# Patient Record
(demographics unavailable — no encounter records)

---

## 2025-04-25 NOTE — CONSULT LETTER
[Today's Date] : [unfilled] [Name] : Name: [unfilled] [] : : ~~ [Today's Date:] : [unfilled] [Dear  ___:] : Dear Dr. [unfilled]: [Consult] : I had the pleasure of evaluating your patient, [unfilled]. My full evaluation follows. [Consult - Single Provider] : Thank you very much for allowing me to participate in the care of this patient. If you have any questions, please do not hesitate to contact me. [Sincerely,] : Sincerely, [de-identified] : Thelma Ratliff MD, FACC Attending, Pediatric Cardiology Non-Invasive Imaging and F[facility]  [facility] [place] [address] Office: [phone] Fax: [phone]

## 2025-04-25 NOTE — DISCUSSION/SUMMARY
[May participate in all age-appropriate activities] : [unfilled] May participate in all age-appropriate activities. [FreeTextEntry1] : In summary, KANDICE is a 24-day-old female infant with an innocent flow murmur. The history, physical exam, EKG, and echocardiogram are reassuring. I discussed at length with the family that the murmur is not related to cardiac pathology.  The family verbalized understanding, and all questions were answered. No further cardiology follow-up is required.  [Needs SBE Prophylaxis] : [unfilled] does not need bacterial endocarditis prophylaxis

## 2025-04-25 NOTE — REVIEW OF SYSTEMS
[Nl] : no feeding issues at this time. [Acting Fussy] : not acting ~L fussy [Fever] : no fever [Wgt Loss (___ Lbs)] : no recent weight loss [Pallor] : not pale [Discharge] : no discharge [Redness] : no redness [Nasal Discharge] : no nasal discharge [Nasal Stuffiness] : no nasal congestion [Stridor] : no stridor [Cyanosis] : no cyanosis [Edema] : no edema [Diaphoresis] : not diaphoretic [Tachypnea] : not tachypneic [Wheezing] : no wheezing [Cough] : no cough [Being A Poor Eater] : not a poor eater [Vomiting] : no vomiting [Diarrhea] : no diarrhea [Decrease In Appetite] : appetite not decreased [Fainting (Syncope)] : no fainting [Dec Consciousness] :  no decrease in consciousness [Seizure] : no seizures [Hypotonicity (Flaccid)] : not hypotonic [Refusal to Bear Wgt] : normal weight bearing [Puffy Hands/Feet] : no hand/feet puffiness [Rash] : no rash [Hemangioma] : no hemangioma [Jaundice] : no jaundice [Wound problems] : no wound problems [Bruising] : no tendency for easy bruising [Swollen Glands] : no lymphadenopathy [Enlarged [place]] : the fontanelle was not enlarged [Hoarse Cry] : no hoarse cry [Failure To Thrive] : no failure to thrive [Vaginal Discharge] : no vaginal discharge [Ambiguous Genitals] : genitals not ambiguous [Dec Urine Output] : no oliguria

## 2025-04-25 NOTE — HISTORY OF PRESENT ILLNESS
[FreeTextEntry1] : KANDICE is a 24-day-old old female infant who was referred for cardiology consultation due to a heart murmur. The murmur was first diagnosed at birth and recommended for outpatient pediatric cardiology follow-up.  The baby was brought to cardiology clinic by the mother.  In speaking with the mother, baby has been doing well with no symptoms referable to cardiovascular system.  She has not had tachypnea, increased work of breathing, diaphoresis or cyanosis.  She has been feeding well without difficulty and gaining weight appropriately.